# Patient Record
Sex: MALE | URBAN - METROPOLITAN AREA
[De-identification: names, ages, dates, MRNs, and addresses within clinical notes are randomized per-mention and may not be internally consistent; named-entity substitution may affect disease eponyms.]

---

## 2022-01-19 ENCOUNTER — APPOINTMENT (OUTPATIENT)
Dept: URBAN - METROPOLITAN AREA SURGERY 9 | Age: 52
Setting detail: DERMATOLOGY
End: 2022-01-20

## 2022-01-19 PROBLEM — D03.4 MELANOMA IN SITU OF SCALP AND NECK: Status: ACTIVE | Noted: 2022-01-19

## 2022-01-19 PROCEDURE — 12032 INTMD RPR S/A/T/EXT 2.6-7.5: CPT

## 2022-01-19 PROCEDURE — OTHER MIPS QUALITY: OTHER

## 2022-01-19 PROCEDURE — 11623 EXC S/N/H/F/G MAL+MRG 2.1-3: CPT

## 2022-01-19 PROCEDURE — OTHER EXCISION: OTHER

## 2022-01-19 PROCEDURE — OTHER RETURN TO REFERRING PROVIDER: OTHER

## 2022-01-19 PROCEDURE — OTHER CONSULTATION EXCISION: OTHER

## 2022-01-19 NOTE — PROCEDURE: EXCISION
Path Notes (To The Dermatopathologist): Please check margins. Superior score. Prior path has site name listed as ”Right crown of scalp”, but site is clearly located on the left side, hence the name change.

## 2022-01-19 NOTE — PROCEDURE: EXCISION
Post-Care Instructions: Flap no tape wound care. Handout given. Patient’s wife, who is a nurse, will remove sutures.

## 2022-01-19 NOTE — PROCEDURE: MIPS QUALITY
Quality 137: Melanoma: Continuity Of Care - Recall System: Documentation of system reason(s) for not entering patient's information into a recall system (eg, melanoma being monitored by another physician provider)
Detail Level: Simple
Quality 138: Melanoma: Coordination Of Care: A treatment plan was communicated to the physicians providing continuing care within one month of diagnosis outlining: diagnosis, tumor thickness and a plan for surgery or alternate care.

## 2022-01-19 NOTE — HPI: SURGICAL CONSULTATION
Has The Lesion Been Biopsied Before?: has been previously biopsied
What Is The Location Of The Lesion?: Left superior occipital scalp
Who Is Your Referring Physician?: Ryan Tierney
When Was Your Biopsy?: 12/13/2021